# Patient Record
Sex: MALE | Race: WHITE
[De-identification: names, ages, dates, MRNs, and addresses within clinical notes are randomized per-mention and may not be internally consistent; named-entity substitution may affect disease eponyms.]

---

## 2019-01-07 ENCOUNTER — HOSPITAL ENCOUNTER (EMERGENCY)
Dept: HOSPITAL 44 - ED | Age: 34
End: 2019-01-07
Payer: MEDICAID

## 2019-01-07 VITALS — DIASTOLIC BLOOD PRESSURE: 75 MMHG | SYSTOLIC BLOOD PRESSURE: 114 MMHG
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
## 2019-01-07 DIAGNOSIS — F10.230: Primary | ICD-10-CM

## 2019-01-07 DIAGNOSIS — E86.0: ICD-10-CM

## 2019-01-07 DIAGNOSIS — R11.2: ICD-10-CM

## 2019-01-07 LAB
APPEARANCE UR: CLEAR
BASOPHILS NFR BLD: 0.5 % (ref 0–1.5)
COLOR,URINE: YELLOW
EGFR (NON-AFRICAN): > 60
EOSINOPHIL NFR BLD: 0.8 % (ref 0–6.8)
MCH RBC QN AUTO: 29.4 PG (ref 28–34)
MCV RBC AUTO: 90 FL (ref 80–100)
MONOCYTES %: 7.8 % (ref 0–11)
NEUTROPHILS #: 3.8 # K/UL (ref 1.4–7.7)
PH UR STRIP: 7 [PH] (ref 5–8)
RBC UR QL: NEGATIVE
UROBILINOGEN URINE: 0.2 EU (ref 0.2–1)

## 2019-01-07 PROCEDURE — 96365 THER/PROPH/DIAG IV INF INIT: CPT

## 2019-01-07 PROCEDURE — 80320 DRUG SCREEN QUANTALCOHOLS: CPT

## 2019-01-07 PROCEDURE — 99284 EMERGENCY DEPT VISIT MOD MDM: CPT

## 2019-01-07 PROCEDURE — 99283 EMERGENCY DEPT VISIT LOW MDM: CPT

## 2019-01-07 PROCEDURE — S1016 NON-PVC INTRAVENOUS ADMINIST: HCPCS

## 2019-01-07 PROCEDURE — 36415 COLL VENOUS BLD VENIPUNCTURE: CPT

## 2019-01-07 PROCEDURE — 81002 URINALYSIS NONAUTO W/O SCOPE: CPT

## 2019-01-07 PROCEDURE — 80053 COMPREHEN METABOLIC PANEL: CPT

## 2019-01-07 PROCEDURE — G0480 DRUG TEST DEF 1-7 CLASSES: HCPCS

## 2019-01-07 PROCEDURE — 85025 COMPLETE CBC W/AUTO DIFF WBC: CPT

## 2019-01-07 NOTE — ED PHYSICIAN DOCUMENTATION
General Adult





- HISTORIAN


Historian: patient





- HPI


Stated Complaint: n/v/d


Chief Complaint: Nausea,Vomiting,Diarrhea


Additional Information: 





intro self as NP. pt presents to the ED via POV c/o n/v/d x 4 days. pt reports 

his wife was admitted to the hospital for gastroenteritis. pt reports ETOH 2 

days ago. pt reports he has not been able to eat/drink in the past 2 days.  

denies fever, reports diaphoresis, chills.  





pt denies current chest pain, dyspnea, syncope/near syncope, headache, 

dizziness, visual disturbances,  fever, rash,  dysuria, trauma. melena or 

hematochezia, bleeding or easy bruising, change in bowel or bladder function, no

recent weight loss/gain, anxiety or depression. ROS Negative unless otherwise 

specified. 





- ROS


CONST: sweating, chills





- PAST HX


Past History: none


Other History: none


Surgeries/Procedures: none


Allergies/Adverse Reactions: 


                                    Allergies











Allergy/AdvReac Type Severity Reaction Status Date / Time


 


No Known Allergies Allergy   Verified 01/07/19 10:53














Home Medications: 


                                Ambulatory Orders











 Medication  Instructions  Recorded


 


Albuterol Sulfate [Proair 90 mcg IH Q6H PRN 01/07/19





Respiclick]  














- SOCIAL HX


Smoking History: less than 1 pack/day


Alcohol Use: occasionally


Drug Use: marijuana





- FAMILY HX


Family History: No





- VITAL SIGNS


Vital Signs: 





                                   Vital Signs











Temp Pulse Resp BP Pulse Ox


 


          136/74    


 


          03/18/13 11:43   














- REVIEWED ASSESSMENTS


Nursing Assessment  Reviewed: Yes


Vitals Reviewed: Yes





Progress





- Progress


Progress: 


1213: 114/75  RR 20. pt reports readiness for D/C. i advised pt he should 

have more IV fluids. Pt refused.  advised pt to drink fluids at home. pt 

verbalized understanding. 





ED Results Lab/Radiology





- Orders


Orders: 





                                    ED Orders











 Category Date Time Status


 


 Blood Pressure 1T Care  01/07/19 10:26 Ordered


 


 Continuous EKG monitoring Q30M Care  01/07/19 10:25 Ordered


 


 Continuous Pulse Oximetry Q30M Care  01/07/19 10:25 Ordered


 


 Orthostatics 1T Care  01/07/19 10:20 Ordered


 


 Place IV Lock 1T Care  01/07/19 10:12 Active


 


 CBC/PLATELET/DIFF Stat Lab  01/07/19 10:12 Ordered


 


 CMP [CMP] Stat Lab  01/07/19 10:12 Ordered


 


 0.9 % Sodium Chloride [Normal Saline] 1,000 ml Med  01/07/19 10:12 Active





 IV Q1H   














General Adult Physical Exam





- PHYSICAL EXAM


GENERAL APPEARANCE: mild distress


EENT: eye inspection normal, ENT inspection normal, pharynx normal, TERESA, no 

nystagmus, TM's nml, dry mucous membranes


NECK: normal inspection, thyroid normal


RESPIRATORY: no resp distress, chest non-tender, breath sounds normal.  No: 

wheezes, rales, rhonchi


CVS: reg rate & rhythm (tachycardia), heart sounds normal, equal pulses, no 

murmur, no gallop, PMI nml, no JVD, no friction rub, 24


ABDOMEN: soft, no organomegaly, normal bowel sounds, no abdominal bruit, no 

distension.  No: tenderness


BACK: normal inspection, no CVA tenderness


SKIN: diaphoresis (mild)


EXTREMITIES: non-tender, normal range of motion, no evidence of injury, no 

edema, J, NP


NEURO: oriented X3, motor nml, sensation nml, mood/affect nml





Discharge


Clincal Impression: 


 Alcohol withdrawal syndrome without complication, Dehydration





Vomiting


Qualifiers:


 Vomiting type: unspecified Vomiting Intractability: non-intractable Nausea pr

esence: with nausea Qualified Code(s): R11.2 - Nausea with vomiting, unspecified





Referrals: 


Primary Doctor,No [Primary Care Provider] - 2 Days


Additional Instructions: 


Follow up with the clinic next week to get your labs rechecked. 





Rest





increase fluids like gatoraid or other electrolyte replacement substance. 





Stop drinking. Taper off and seek detox program. 





take multivitamin daily 





seek medical care immediately if difficult to wake or weakness, difficulty 

breathing, feeling faint or fainting, 


increased rash, chest pain, shortness of breath, or fever not controlled by 

tylenol/motrin or any concern. 





follow up with primary care next week or before if not improving as expected. 





PLEASE UNDERSTAND THAT THIS IS AN EMERGENCY EVALUATION FOR YOUR COMPLAINT AND BY

NATURE IS LIMITED AND NOT A SUBSTITUTE FOR ONGOING MEDICAL CARE. EVEN THOUGH 

TEST RESULTS AND TREATMENT PLAN WERE EXPLAINED THERE MAY BE A NEED FOR 

ADDITIONAL TESTING TO FULLY DETERMINE THE EXTENT OF YOUR ILLNESS/INJURY/OR 

CONCERN SO YOU SHOULD CONTACT AND OR ESTABLISH WITH A PRIMARY CARE PROVIDER (OR 

REFERRAL DOCTOR IF APPLICABLE) FOR AN APPOINTMENT AS SOON AS POSSIBLE





Condition: Good


Decision to Admit: NO


Date of Decison to Admit: 01/07/19


Decision Time: 11:40

## 2019-11-15 ENCOUNTER — HOSPITAL ENCOUNTER (EMERGENCY)
Dept: HOSPITAL 44 - ED | Age: 34
Discharge: HOME | End: 2019-11-15
Payer: MEDICAID

## 2019-11-15 VITALS — DIASTOLIC BLOOD PRESSURE: 68 MMHG | SYSTOLIC BLOOD PRESSURE: 151 MMHG

## 2019-11-15 DIAGNOSIS — M25.561: Primary | ICD-10-CM

## 2019-11-15 PROCEDURE — 73562 X-RAY EXAM OF KNEE 3: CPT

## 2019-11-15 PROCEDURE — 99282 EMERGENCY DEPT VISIT SF MDM: CPT

## 2019-11-15 PROCEDURE — 99284 EMERGENCY DEPT VISIT MOD MDM: CPT

## 2019-11-15 PROCEDURE — 96372 THER/PROPH/DIAG INJ SC/IM: CPT

## 2019-11-15 RX ADMIN — KETOROLAC TROMETHAMINE ONE MG: 30 INJECTION, SOLUTION INTRAMUSCULAR at 11:42

## 2019-11-15 NOTE — DIAGNOSTIC IMAGING REPORT
PATIENT MR#:   [id number]

PATIENT ACCT#: [id number]

PATIENT NAME:  ESME JIM

YOB: 1985

REFERRING PHYSICIAN: Dragan Gayle

EXAM DATE:        11/15/2019

ACCESSION NUMBER: [id number]

EXAM DESCRIPTION: KNEE 3 VIEWS



Exam:  Right knee 3 views 

Indication: RT KNEE, PAIN IN RT KNEE AFTER FALL TODAY, PT STATES HE GOT TRIPPED UP BY A DOG (Hx) /

-------------------------------------------------- Note time : 11/15/2019 12:00:13 PM User : Laure gomez RT KNEE,

PAIN IN RT KNEE AFTER FALL TODAY, PT STATES HE GOT TRIPPED UP BY A DOG

-------------------------------------------------- (DICOM Hx) (DICOM Hx)  

Findings: 

No acute fracture, subluxation, dislocation or other osseous abnormality is identified.

If clinical symptoms persist follow up examination may be warranted to exclude an occult process.

Impression: No acute osseous abnormality.



Electronically signed by: Dragan Glaser on 11/15/2019 12:07:58









Read by:        Dr. Dragan Glaser

Transcribed by:   

Transcribed Date: 

Electronically signed by: Dr. Dragan Glaser

Date signed: 11/15/2019 12:11:13 PM

## 2019-11-15 NOTE — ED PHYSICIAN DOCUMENTATION
General Adult





- HISTORIAN


Historian: patient





- HPI


Stated Complaint: R knee pain


Chief Complaint: General Adult


Onset: minutes


Timing: still present


Severity: moderate


Further Comments: yes (Pt is a 33 yo male with R knee pain.  Pt tripped over his

dog.  Pt has hx of ligamentous injury to ? R lateral collateral ligament 11 

years ago from football.  Pt is concerned about possibly having re-injured it.)





- ROS


CONST: no problems


EYES/ENT: none


CVS/RESP: none


MS/SKIN/LYMPH: other (R knee pain)





- PAST HX


Past History: other (previous R knee injury)


Allergies/Adverse Reactions: 


                                    Allergies











Allergy/AdvReac Type Severity Reaction Status Date / Time


 


No Known Allergies Allergy   Verified 11/15/19 11:17














Home Medications: 


                                Ambulatory Orders











 Medication  Instructions  Recorded


 


NK  05/02/19














- SOCIAL HX


Smoking History: cigarettes





- FAMILY HX


Family History: No





- VITAL SIGNS


Vital Signs: 





                                   Vital Signs











Temp Pulse Resp BP Pulse Ox


 


          132/78    


 


          05/02/19 12:59   














- REVIEWED ASSESSMENTS


Nursing Assessment  Reviewed: Yes


Vitals Reviewed: Yes





Progress





- Progress


Progress: 





X-ray R knee:   No acute osseous abnormality.





 Toradol 60 mg IM








Pt has knee immobilizer and knee support wraps at home.


ACE wrap in ER until pt returns home.








crutches








NSAIDS


wear knee support or immobilizer


f/u pcp/ortho if sx persist.





General Adult Physical Exam





- PHYSICAL EXAM


GENERAL APPEARANCE: moderate distress


NECK: normal inspection, supple


RESPIRATORY: no resp distress, chest non-tender, breath sounds normal


CVS: reg rate & rhythm, heart sounds normal


BACK: normal inspection


SKIN: warm/dry, normal color


EXTREMITIES: other (R knee pain, laterally and with stress to lateral 

collateral; appears stable; no effusion.)


NEURO: oriented X3, motor nml, sensation nml





Discharge


Clincal Impression: 


 R knee pain





Referrals: 


Primary Doctor,No [Primary Care Provider] - 


Condition: Stable


Disposition: 01 HOME, SELF-CARE


Decision to Admit: NO


Decision Time: 12:30